# Patient Record
Sex: FEMALE | Race: WHITE | NOT HISPANIC OR LATINO | Employment: OTHER | URBAN - METROPOLITAN AREA
[De-identification: names, ages, dates, MRNs, and addresses within clinical notes are randomized per-mention and may not be internally consistent; named-entity substitution may affect disease eponyms.]

---

## 2020-01-22 PROBLEM — I10 ESSENTIAL HYPERTENSION: Status: ACTIVE | Noted: 2020-01-22

## 2020-10-27 PROBLEM — A31.0 PULMONARY MYCOBACTERIUM AVIUM INFECTION: Status: ACTIVE | Noted: 2019-06-20

## 2020-10-27 PROBLEM — H35.3221 EXUDATIVE AGE-RELATED MACULAR DEGENERATION OF LEFT EYE WITH ACTIVE CHOROIDAL NEOVASCULARIZATION: Status: ACTIVE | Noted: 2019-05-28

## 2020-10-27 PROBLEM — J84.10 PULMONARY FIBROSIS: Status: ACTIVE | Noted: 2019-04-29

## 2020-10-27 PROBLEM — R91.1 PULMONARY NODULE: Status: ACTIVE | Noted: 2019-04-29

## 2021-12-02 PROBLEM — M10.9 GOUT: Status: ACTIVE | Noted: 2021-12-02

## 2021-12-02 PROBLEM — N20.0 NEPHROLITHIASIS: Status: ACTIVE | Noted: 2021-04-23

## 2021-12-02 PROBLEM — Z96.0 S/P CYSTOSCOPY WITH URETERAL STENT PLACEMENT: Status: ACTIVE | Noted: 2021-04-27

## 2022-03-01 PROBLEM — H35.3112 INTERMEDIATE STAGE NONEXUDATIVE AGE-RELATED MACULAR DEGENERATION OF RIGHT EYE: Status: ACTIVE | Noted: 2022-03-01

## 2022-03-01 PROBLEM — M81.0 AGE-RELATED OSTEOPOROSIS WITHOUT CURRENT PATHOLOGICAL FRACTURE: Status: ACTIVE | Noted: 2022-03-01

## 2022-04-12 PROBLEM — N18.4 CHRONIC KIDNEY DISEASE, STAGE 4 (SEVERE): Status: ACTIVE | Noted: 2022-04-12

## 2022-04-12 PROBLEM — M79.604 RIGHT LEG PAIN: Status: ACTIVE | Noted: 2022-04-12

## 2022-06-07 PROBLEM — S52.614A CLOSED NONDISPLACED FRACTURE OF STYLOID PROCESS OF RIGHT ULNA: Status: ACTIVE | Noted: 2022-06-07

## 2022-06-07 PROBLEM — S52.501A CLOSED FRACTURE OF RIGHT DISTAL RADIUS: Status: ACTIVE | Noted: 2022-06-07

## 2022-07-11 PROBLEM — R29.6 RECURRENT FALLS: Status: ACTIVE | Noted: 2022-07-11

## 2022-08-07 PROBLEM — E55.9 VITAMIN D INSUFFICIENCY: Status: ACTIVE | Noted: 2022-08-07

## 2022-08-20 PROBLEM — I49.1 PAC (PREMATURE ATRIAL CONTRACTION): Status: ACTIVE | Noted: 2022-08-20

## 2022-08-20 PROBLEM — R06.00 DYSPNEA: Status: ACTIVE | Noted: 2022-08-20

## 2022-08-20 PROBLEM — N30.00 ACUTE CYSTITIS WITHOUT HEMATURIA: Status: ACTIVE | Noted: 2022-08-20

## 2022-09-13 PROBLEM — Z96.1 PSEUDOPHAKIA OF BOTH EYES: Status: ACTIVE | Noted: 2022-09-13

## 2022-09-13 PROBLEM — H04.123 DRY EYE SYNDROME OF BOTH EYES: Status: ACTIVE | Noted: 2022-09-13

## 2022-09-27 PROBLEM — I26.99 PULMONARY THROMBOEMBOLISM: Status: ACTIVE | Noted: 2022-09-27

## 2022-09-27 PROBLEM — I82.4Y2 ACUTE DEEP VEIN THROMBOSIS (DVT) OF PROXIMAL VEIN OF LEFT LOWER EXTREMITY: Status: ACTIVE | Noted: 2022-09-27

## 2022-09-27 PROBLEM — N63.10 BREAST MASS, RIGHT: Status: ACTIVE | Noted: 2022-09-27

## 2022-09-28 PROBLEM — I26.99 PULMONARY THROMBOEMBOLISM: Status: ACTIVE | Noted: 2022-09-28

## 2022-10-27 PROBLEM — C50.311 MALIGNANT NEOPLASM OF LOWER-INNER QUADRANT OF RIGHT FEMALE BREAST: Status: ACTIVE | Noted: 2022-10-27

## 2022-10-27 PROBLEM — K62.5 BRBPR (BRIGHT RED BLOOD PER RECTUM): Status: ACTIVE | Noted: 2022-10-27

## 2022-11-02 PROBLEM — I12.9 HYPERTENSIVE NEPHROSCLEROSIS: Status: ACTIVE | Noted: 2022-11-02

## 2022-11-02 PROBLEM — E87.6 HYPOKALEMIA: Status: ACTIVE | Noted: 2022-11-02

## 2022-11-02 PROBLEM — N18.32 STAGE 3B CHRONIC KIDNEY DISEASE: Status: ACTIVE | Noted: 2022-11-02

## 2022-12-05 PROBLEM — Z79.811 AROMATASE INHIBITOR USE: Status: ACTIVE | Noted: 2022-12-05

## 2022-12-05 PROBLEM — D53.9 MACROCYTIC ANEMIA: Status: ACTIVE | Noted: 2022-12-05

## 2022-12-05 PROBLEM — Z71.89 GOALS OF CARE, COUNSELING/DISCUSSION: Status: ACTIVE | Noted: 2022-12-05

## 2023-01-02 PROBLEM — I26.99 PULMONARY THROMBOEMBOLISM: Status: RESOLVED | Noted: 2022-09-28 | Resolved: 2023-01-02

## 2023-01-02 PROBLEM — I26.99 ACUTE PULMONARY EMBOLISM WITHOUT ACUTE COR PULMONALE: Status: RESOLVED | Noted: 2022-09-27 | Resolved: 2023-01-02

## 2023-01-30 PROBLEM — K62.5 BRBPR (BRIGHT RED BLOOD PER RECTUM): Status: RESOLVED | Noted: 2022-10-27 | Resolved: 2023-01-30

## 2023-05-16 PROBLEM — N18.4 CHRONIC KIDNEY DISEASE, STAGE 4 (SEVERE): Status: RESOLVED | Noted: 2022-04-12 | Resolved: 2023-05-16

## 2023-05-16 PROBLEM — N25.81 SECONDARY HYPERPARATHYROIDISM OF RENAL ORIGIN: Chronic | Status: ACTIVE | Noted: 2023-05-16

## 2023-12-13 PROBLEM — I48.91 ATRIAL FIBRILLATION WITH RVR: Status: ACTIVE | Noted: 2023-12-13

## 2023-12-13 PROBLEM — I21.4 NSTEMI (NON-ST ELEVATED MYOCARDIAL INFARCTION): Status: ACTIVE | Noted: 2023-12-13

## 2023-12-13 PROBLEM — J18.9 PNEUMONIA OF BOTH LOWER LOBES DUE TO INFECTIOUS ORGANISM: Status: ACTIVE | Noted: 2023-12-13

## 2023-12-14 PROBLEM — I50.20 SYSTOLIC HEART FAILURE: Status: ACTIVE | Noted: 2023-12-14

## 2023-12-14 PROBLEM — R79.89 ELEVATED TROPONIN I LEVEL: Status: ACTIVE | Noted: 2023-12-14

## 2023-12-17 PROBLEM — E87.6 HYPOKALEMIA: Status: RESOLVED | Noted: 2022-11-02 | Resolved: 2023-12-17

## 2023-12-21 ENCOUNTER — PATIENT OUTREACH (OUTPATIENT)
Dept: ADMINISTRATIVE | Facility: CLINIC | Age: 88
End: 2023-12-21

## 2023-12-21 NOTE — PROGRESS NOTES
C3 nurse spoke with Wanda Real's son Casey for a TCC post hospital discharge follow up call. The patient has a scheduled appointment with Lilian Pablo APRN on 01/03/24 @ 1500. Will route message to PCP staff to change visit type to hospital follow up.

## 2024-01-31 PROBLEM — R06.09 DOE (DYSPNEA ON EXERTION): Status: ACTIVE | Noted: 2022-08-20

## 2024-03-18 PROBLEM — J18.9 PNEUMONIA OF BOTH LOWER LOBES DUE TO INFECTIOUS ORGANISM: Status: RESOLVED | Noted: 2023-12-13 | Resolved: 2024-03-18

## 2024-03-18 PROBLEM — I21.4 NSTEMI (NON-ST ELEVATED MYOCARDIAL INFARCTION): Status: RESOLVED | Noted: 2023-12-13 | Resolved: 2024-03-18

## 2024-04-01 PROBLEM — I50.20 SYSTOLIC HEART FAILURE: Status: RESOLVED | Noted: 2023-12-14 | Resolved: 2024-04-01

## 2024-04-01 PROBLEM — I48.0 PAF (PAROXYSMAL ATRIAL FIBRILLATION): Status: ACTIVE | Noted: 2023-12-13
